# Patient Record
Sex: FEMALE | Race: WHITE | NOT HISPANIC OR LATINO | Employment: PART TIME | URBAN - METROPOLITAN AREA
[De-identification: names, ages, dates, MRNs, and addresses within clinical notes are randomized per-mention and may not be internally consistent; named-entity substitution may affect disease eponyms.]

---

## 2017-11-25 ENCOUNTER — HOSPITAL ENCOUNTER (EMERGENCY)
Facility: HOSPITAL | Age: 32
Discharge: HOME | End: 2017-11-25
Payer: MEDICAID

## 2017-11-25 VITALS
HEART RATE: 93 BPM | RESPIRATION RATE: 18 BRPM | WEIGHT: 210 LBS | SYSTOLIC BLOOD PRESSURE: 103 MMHG | TEMPERATURE: 98 F | DIASTOLIC BLOOD PRESSURE: 55 MMHG | OXYGEN SATURATION: 99 % | BODY MASS INDEX: 31.83 KG/M2 | HEIGHT: 68 IN

## 2017-11-25 DIAGNOSIS — S93.401A SPRAIN OF RIGHT ANKLE, UNSPECIFIED LIGAMENT, INITIAL ENCOUNTER: Primary | ICD-10-CM

## 2017-11-25 DIAGNOSIS — M25.571 ANKLE PAIN, RIGHT: ICD-10-CM

## 2017-11-25 LAB
B-HCG UR QL: NEGATIVE
CTP QC/QA: YES

## 2017-11-25 PROCEDURE — 99284 EMERGENCY DEPT VISIT MOD MDM: CPT | Mod: 25

## 2017-11-25 PROCEDURE — 63600175 PHARM REV CODE 636 W HCPCS: Performed by: PHYSICIAN ASSISTANT

## 2017-11-25 PROCEDURE — 81025 URINE PREGNANCY TEST: CPT | Performed by: PHYSICIAN ASSISTANT

## 2017-11-25 PROCEDURE — 96372 THER/PROPH/DIAG INJ SC/IM: CPT | Mod: 59

## 2017-11-25 RX ORDER — KETOROLAC TROMETHAMINE 30 MG/ML
30 INJECTION, SOLUTION INTRAMUSCULAR; INTRAVENOUS
Status: COMPLETED | OUTPATIENT
Start: 2017-11-25 | End: 2017-11-25

## 2017-11-25 RX ORDER — IBUPROFEN 800 MG/1
800 TABLET ORAL EVERY 6 HOURS PRN
Qty: 20 TABLET | Refills: 0 | Status: SHIPPED | OUTPATIENT
Start: 2017-11-25 | End: 2018-11-28

## 2017-11-25 RX ADMIN — KETOROLAC TROMETHAMINE 30 MG: 30 INJECTION, SOLUTION INTRAMUSCULAR at 05:11

## 2017-11-25 NOTE — ED TRIAGE NOTES
Pt states that she was getting down from her grandmothers bed yesterday around 11 am and twisted her right ankle. Pt states pain and swelling got worse at work and now she can barely put pressure on it. Pt denies hearing or feeling a crack or pop. Pt in no acute distress will continue to monitor.

## 2017-11-25 NOTE — ED PROVIDER NOTES
Encounter Date: 2017    SCRIBE #1 NOTE: I, Maisha Nvearez, am scribing for, and in the presence of,  MILANA Felton . I have scribed the following portions of the note - Other sections scribed: HPI and ROS .       History     Chief Complaint   Patient presents with    Ankle Pain     Pt reports R ankle pain since yesterday morning.  States she got out of bed and felt pain and then went to work and it got worse.      CC: Ankle Pain.   History obtained from patient.  Pt arrived via personal transportation.     HPI: This 32 y.o. Female, who has Hepatitis C, presents to the ED for evaluation of R ankle pain secondary to inverting her ankle when stepping down from bed yesterday morning. She went to work (as a ) yesterday and when she got off of work, pain in R ankle had significantly worsened. She states pain is now severe and she is unable to bear weight on the RLE. She has attempted no treatment prior to arrival in the ED. No alleviating factors. She denies numbness. No similar previous injury. She reports no further symptoms.       The history is provided by the patient. No  was used.     Review of patient's allergies indicates:  No Known Allergies  Past Medical History:   Diagnosis Date     history     Hepatitis C     Hepatitis C      History reviewed. No pertinent surgical history.  Family History   Problem Relation Age of Onset    Cancer Mother      cervical    Hypertension       grandfather    Diabetes       grandmother/grandfather    Diabetes Maternal Grandmother     Diabetes Maternal Grandfather     Hypertension Maternal Grandfather      Social History   Substance Use Topics    Smoking status: Current Every Day Smoker     Types: Cigarettes    Smokeless tobacco: Never Used      Comment: 1pk/3days    Alcohol use No     Review of Systems   Constitutional: Negative for chills and fever.   HENT: Negative for congestion.    Eyes: Negative for redness.    Respiratory: Negative for cough and shortness of breath.    Cardiovascular: Negative for chest pain.   Gastrointestinal: Negative for diarrhea, nausea and vomiting.   Genitourinary: Negative for dysuria.   Musculoskeletal: Positive for arthralgias (R ankle ).   Skin: Negative for rash and wound.   Neurological: Negative for numbness.       Physical Exam     Initial Vitals [11/25/17 0312]   BP Pulse Resp Temp SpO2   114/64 109 18 98.6 °F (37 °C) 99 %      MAP       80.67         Physical Exam    Constitutional: She appears well-developed and well-nourished.   Cardiovascular: Normal rate, regular rhythm, normal heart sounds and intact distal pulses.   Musculoskeletal: Normal range of motion. She exhibits edema and tenderness (lateral aspect of right ankle).   Neurological: She is alert and oriented to person, place, and time. She has normal strength.   Skin: Skin is warm.   Psychiatric: She has a normal mood and affect.         ED Course   Procedures  Labs Reviewed   POCT URINE PREGNANCY             Medical Decision Making:   Initial Assessment:   The patient appears to have an ankle sprain.  The patient's xrays show no signs of fracture, dislocation, or subluxation.  The patient could have a ligamentous injury, but the ankle doesn't appear to be unstable.  The patient will be discharged home to follow up with their physician or the doctor provided.  They will be treated with supportive care.             Scribe Attestation:   Scribe #1: I performed the above scribed service and the documentation accurately describes the services I performed. I attest to the accuracy of the note.    Attending Attestation:           Physician Attestation for Scribe:  Physician Attestation Statement for Scribe #1: I, MILANA Felton, reviewed documentation, as scribed by Maisha Nevarez in my presence, and it is both accurate and complete.                 ED Course      Clinical Impression:   The primary encounter diagnosis was Sprain  of right ankle, unspecified ligament, initial encounter. A diagnosis of Ankle pain, right was also pertinent to this visit.                           MILANA Hernandez  12/09/17 1926

## 2018-11-28 PROBLEM — F11.20 NARCOTIC DEPENDENCE: Status: ACTIVE | Noted: 2018-11-28

## 2018-12-04 PROBLEM — Z67.91 RH NEGATIVE STATE IN ANTEPARTUM PERIOD: Status: ACTIVE | Noted: 2018-12-04

## 2018-12-04 PROBLEM — O26.899 RH NEGATIVE STATE IN ANTEPARTUM PERIOD: Status: ACTIVE | Noted: 2018-12-04

## 2018-12-09 ENCOUNTER — HOSPITAL ENCOUNTER (EMERGENCY)
Facility: HOSPITAL | Age: 33
Discharge: HOME | End: 2018-12-09
Attending: EMERGENCY MEDICINE
Payer: MEDICAID

## 2018-12-09 VITALS
HEIGHT: 68 IN | WEIGHT: 180 LBS | TEMPERATURE: 98 F | OXYGEN SATURATION: 97 % | RESPIRATION RATE: 18 BRPM | SYSTOLIC BLOOD PRESSURE: 120 MMHG | DIASTOLIC BLOOD PRESSURE: 62 MMHG | HEART RATE: 97 BPM | BODY MASS INDEX: 27.28 KG/M2

## 2018-12-09 DIAGNOSIS — F11.20 METHADONE DEPENDENCE: ICD-10-CM

## 2018-12-09 DIAGNOSIS — O21.9 NAUSEA/VOMITING IN PREGNANCY: ICD-10-CM

## 2018-12-09 DIAGNOSIS — R10.13 EPIGASTRIC ABDOMINAL PAIN: Primary | ICD-10-CM

## 2018-12-09 LAB
ALBUMIN SERPL BCP-MCNC: 3.9 G/DL
ALP SERPL-CCNC: 64 U/L
ALT SERPL W/O P-5'-P-CCNC: 31 U/L
ANION GAP SERPL CALC-SCNC: 10 MMOL/L
AST SERPL-CCNC: 32 U/L
B-HCG UR QL: POSITIVE
BACTERIA #/AREA URNS HPF: NORMAL /HPF
BASOPHILS # BLD AUTO: 0.01 K/UL
BASOPHILS NFR BLD: 0.1 %
BILIRUB SERPL-MCNC: 0.7 MG/DL
BILIRUB UR QL STRIP: NEGATIVE
BUN SERPL-MCNC: 5 MG/DL
CALCIUM SERPL-MCNC: 9.7 MG/DL
CHLORIDE SERPL-SCNC: 104 MMOL/L
CLARITY UR: CLEAR
CO2 SERPL-SCNC: 23 MMOL/L
COLOR UR: YELLOW
CREAT SERPL-MCNC: 0.7 MG/DL
CTP QC/QA: YES
DIFFERENTIAL METHOD: ABNORMAL
EOSINOPHIL # BLD AUTO: 0 K/UL
EOSINOPHIL NFR BLD: 0 %
ERYTHROCYTE [DISTWIDTH] IN BLOOD BY AUTOMATED COUNT: 12.9 %
EST. GFR  (AFRICAN AMERICAN): >60 ML/MIN/1.73 M^2
EST. GFR  (NON AFRICAN AMERICAN): >60 ML/MIN/1.73 M^2
GLUCOSE SERPL-MCNC: 109 MG/DL
GLUCOSE UR QL STRIP: NEGATIVE
HCT VFR BLD AUTO: 39.1 %
HGB BLD-MCNC: 13.4 G/DL
HGB UR QL STRIP: NEGATIVE
KETONES UR QL STRIP: ABNORMAL
LEUKOCYTE ESTERASE UR QL STRIP: ABNORMAL
LIPASE SERPL-CCNC: 8 U/L
LYMPHOCYTES # BLD AUTO: 1 K/UL
LYMPHOCYTES NFR BLD: 9.6 %
MCH RBC QN AUTO: 29.7 PG
MCHC RBC AUTO-ENTMCNC: 34.3 G/DL
MCV RBC AUTO: 87 FL
MICROSCOPIC COMMENT: NORMAL
MONOCYTES # BLD AUTO: 0.4 K/UL
MONOCYTES NFR BLD: 3.6 %
NEUTROPHILS # BLD AUTO: 9 K/UL
NEUTROPHILS NFR BLD: 86.7 %
NITRITE UR QL STRIP: NEGATIVE
PH UR STRIP: 5 [PH] (ref 5–8)
PLATELET # BLD AUTO: 238 K/UL
PMV BLD AUTO: 10.4 FL
POTASSIUM SERPL-SCNC: 4.2 MMOL/L
PROT SERPL-MCNC: 7.6 G/DL
PROT UR QL STRIP: NEGATIVE
RBC # BLD AUTO: 4.51 M/UL
SODIUM SERPL-SCNC: 137 MMOL/L
SP GR UR STRIP: 1.02 (ref 1–1.03)
SQUAMOUS #/AREA URNS HPF: 4 /HPF
URN SPEC COLLECT METH UR: ABNORMAL
UROBILINOGEN UR STRIP-ACNC: ABNORMAL EU/DL
WBC # BLD AUTO: 10.34 K/UL
WBC #/AREA URNS HPF: 3 /HPF (ref 0–5)

## 2018-12-09 PROCEDURE — 63600175 PHARM REV CODE 636 W HCPCS: Performed by: PHYSICIAN ASSISTANT

## 2018-12-09 PROCEDURE — 96374 THER/PROPH/DIAG INJ IV PUSH: CPT

## 2018-12-09 PROCEDURE — 80053 COMPREHEN METABOLIC PANEL: CPT

## 2018-12-09 PROCEDURE — 96376 TX/PRO/DX INJ SAME DRUG ADON: CPT

## 2018-12-09 PROCEDURE — 25000003 PHARM REV CODE 250: Performed by: PHYSICIAN ASSISTANT

## 2018-12-09 PROCEDURE — 81025 URINE PREGNANCY TEST: CPT | Performed by: PHYSICIAN ASSISTANT

## 2018-12-09 PROCEDURE — 81000 URINALYSIS NONAUTO W/SCOPE: CPT

## 2018-12-09 PROCEDURE — 85025 COMPLETE CBC W/AUTO DIFF WBC: CPT

## 2018-12-09 PROCEDURE — 83690 ASSAY OF LIPASE: CPT

## 2018-12-09 PROCEDURE — 99284 EMERGENCY DEPT VISIT MOD MDM: CPT | Mod: 25

## 2018-12-09 RX ORDER — ONDANSETRON 2 MG/ML
4 INJECTION INTRAMUSCULAR; INTRAVENOUS
Status: COMPLETED | OUTPATIENT
Start: 2018-12-09 | End: 2018-12-09

## 2018-12-09 RX ADMIN — LIDOCAINE HYDROCHLORIDE: 20 SOLUTION ORAL; TOPICAL at 09:12

## 2018-12-09 RX ADMIN — ONDANSETRON 4 MG: 2 INJECTION INTRAMUSCULAR; INTRAVENOUS at 08:12

## 2018-12-09 RX ADMIN — ONDANSETRON 4 MG: 2 INJECTION INTRAMUSCULAR; INTRAVENOUS at 11:12

## 2018-12-10 NOTE — ED TRIAGE NOTES
Patient stated that she's having upper gastric pain since yesterday with N&V. Patient was seen at [facility] for same symptoms on yesterday. Pain 10/10. Squeezing and tighness in nature. Patient took her home meds today but vomited after 2 hours of taking. Patient stated that she is 8 weeks pregnant and + for Hep C.

## 2018-12-10 NOTE — ED PROVIDER NOTES
Encounter Date: 2018    SCRIBE #1 NOTE: I, Lana Teague, am scribing for, and in the presence of,  Serjio Escamilla PA-C. I have scribed the following portions of the note - Other sections scribed: HPI and ROS.       History     Chief Complaint   Patient presents with    Abdominal Pain     Pt reports she is 8 wks pregnant. Pt seen at [facility] yesterday for abdominal pain and spotting. Pt d/c'd but still having abdominal pain. Pt report pain continued today and unable to keep her methadone down.      CC: Abdominal Pain    HPI: This 8 weeks gravid 33 y.o female who has Hepatitis C presents to the ED for an evaluation of acute, constant, severe abdominal pain, greater pain to the right upper quadrant, with associated nausea, emesis, and intermittent sweating that began yesterday.  Patient reports she was seen at [facility] for the same complaint, in addition to vaginal spotting, which she reports has since resolved.  She reports being prescribed sublingual Zofran for nausea and emesis.  She reports since last night, she has been having difficulty keeping down any oral intake, including her methadone tablets.  She reports last being able to keep down the methadone on yesterday.  She reports being on Methadone for the past 4 years.  She reports recently being placed on Amoxicillin by Dr. Mclaughlin to treat a UTI.  Patient denies fever, chills, cough, rhinorrhea, chest pain, back pain, shortness of breath, or any other associated symptoms.  No alleviating factors.      The history is provided by the patient. No  was used.     Review of patient's allergies indicates:  No Known Allergies  Past Medical History:   Diagnosis Date     history     Hepatitis C     Hepatitis C      History reviewed. No pertinent surgical history.  Family History   Problem Relation Age of Onset    Cancer Mother         cervical    Hypertension Unknown         grandfather    Diabetes Unknown          grandmother/grandfather    Diabetes Maternal Grandmother     Diabetes Maternal Grandfather     Hypertension Maternal Grandfather      Social History     Tobacco Use    Smoking status: Former Smoker     Types: Cigarettes    Smokeless tobacco: Never Used    Tobacco comment: 1pk/3days   Substance Use Topics    Alcohol use: No    Drug use: No     Review of Systems   Constitutional: Positive for diaphoresis. Negative for chills and fever.   HENT: Negative for ear pain and sore throat.    Eyes: Negative for pain.   Respiratory: Negative for cough and shortness of breath.    Cardiovascular: Negative for chest pain.   Gastrointestinal: Positive for abdominal pain, nausea and vomiting. Negative for diarrhea.   Genitourinary: Negative for dysuria.   Musculoskeletal: Negative for back pain.        (-) arm or leg problems   Skin: Negative for rash.   Neurological: Negative for headaches.       Physical Exam     Initial Vitals [12/09/18 1912]   BP Pulse Resp Temp SpO2   132/67 (!) 114 20 97.9 °F (36.6 °C) 100 %      MAP       --         Physical Exam    Vitals reviewed.  Constitutional: She appears well-developed and well-nourished. She is diaphoretic. She appears distressed.   HENT:   Head: Normocephalic and atraumatic.   Right Ear: External ear normal.   Left Ear: External ear normal.   Nose: Nose normal.   Eyes: Conjunctivae are normal. No scleral icterus.   Neck: Normal range of motion. Neck supple.   Cardiovascular: Regular rhythm, normal heart sounds and intact distal pulses. Tachycardia present.    Pulmonary/Chest: Breath sounds normal. No respiratory distress. She has no wheezes. She has no rhonchi. She has no rales. She exhibits no tenderness.   Abdominal: Soft. Bowel sounds are normal. She exhibits no distension and no mass. There is tenderness in the right upper quadrant and epigastric area. There is no rigidity, no rebound, no guarding, no CVA tenderness, no tenderness at McBurney's point and negative  Rowan's sign.   Musculoskeletal: Normal range of motion. She exhibits no edema.   Neurological: She is alert and oriented to person, place, and time.   Skin: Skin is warm.         ED Course   Procedures  Labs Reviewed   URINALYSIS, REFLEX TO URINE CULTURE - Abnormal; Notable for the following components:       Result Value    Ketones, UA 2+ (*)     Urobilinogen, UA 2.0-3.0 (*)     Leukocytes, UA 1+ (*)     All other components within normal limits    Narrative:     Preferred Collection Type->Urine, Clean Catch   CBC W/ AUTO DIFFERENTIAL - Abnormal; Notable for the following components:    Gran # (ANC) 9.0 (*)     Gran% 86.7 (*)     Lymph% 9.6 (*)     Mono% 3.6 (*)     All other components within normal limits   COMPREHENSIVE METABOLIC PANEL - Abnormal; Notable for the following components:    BUN, Bld 5 (*)     All other components within normal limits   POCT URINE PREGNANCY - Abnormal; Notable for the following components:    POC Preg Test, Ur Positive (*)     All other components within normal limits   LIPASE   URINALYSIS MICROSCOPIC    Narrative:     Preferred Collection Type->Urine, Clean Catch          Imaging Results          US Abdomen Limited (Final result)  Result time 12/09/18 22:57:49    Final result by Jose Hoover MD (12/09/18 22:57:49)                 Impression:      No significant sonographic abnormality.      Electronically signed by: Jose Hoover MD  Date:    12/09/2018  Time:    22:57             Narrative:    EXAMINATION:  US ABDOMEN LIMITED    CLINICAL HISTORY:  Right upper quadrant pain, eval for cholecystitis;    TECHNIQUE:  Limited ultrasound of the right upper quadrant of the abdomen (including pancreas, liver, gallbladder, common bile duct, and right kidney) was performed.    COMPARISON:  None.    FINDINGS:  Liver: Normal in size, measuring 13 cm. Homogeneous echotexture. No focal hepatic lesions.    Gallbladder: No calculi, wall thickening, or pericholecystic fluid.  No  sonographic Rowan's sign.    Biliary system: The common duct is not dilated, measuring 5 mm.  No intrahepatic ductal dilatation.    Right kidney: Normal in size with no hydronephrosis, measuring 11 cm.    Miscellaneous: No upper abdominal ascites.                            TVUS performed at [facility] 12/8/18:  Electronically Signed By: Tommy Liu MD 12/8/2018 1:46 PM CST  Result Narrative  CLINICAL HISTORY: Cramping, bleeding    FINDINGS: The uterus is retroverted and measures 8.0 x 5.6 x 6.8 cm     There is intrauterine gestation identified with yolk sac. Crown-rump length 1.19 cm equaling a gestational age of 7 weeks 3 days.    LEANNA July 20, 2019    Current fetal cardiac rate 143 bpm    Left tube and ovary not visualized    Cul-de-sac appears normal    Right ovary measures 3.4 x 1.9 x 2.5 cm with a corpus luteal cyst measuring 1.9 cm    X-Rays:   Independently Interpreted Readings:   Other Readings:  Right upper quadrant ultrasound with no evidence of cholecystitis, CBD dilatation, or cholelithiasis.  Liver is normal size    Medical Decision Making:   ED Management:  32 y/o pregnant female, 7 weeks 5 days gestation by US, with N/V, abd pain. Pt has methadone dependence and recently dx UTI, and has been unable to keep her meds down x1 day. She is in obvious discomfort, tachycardic, normotensive and diaphoretic initially. She had IV fluids and zofran and was able to start taking her methadone, which resolved most of her symptoms, but she continued to have epigastric and right upper quadrant pain. Labs are without significant abnormality.  Right upper quadrant ultrasound showed no hepatomegaly, cholecystitis, cholelithiasis, or CBD dilatation.  Patient has Zofran from previous ED encounter, as well as amoxicillin for UTI provided by OBGYN.  Patient is tolerating p.o., pain is under control, and is comfortable.  Patient seen at outside hospital ED 2 nights ago for vomiting, abdominal pain, vaginal spotting,  received RhoGAM at that time.  No vaginal bleeding tonight.  Patient is safe for discharge.  She was given return precautions, and instructions to follow up closely with OBGYN.  Case discussed with Dr. Vivian Alarcon Attestation:   Scribe #1: I performed the above scribed service and the documentation accurately describes the services I performed. I attest to the accuracy of the note.    Attending Attestation:           Physician Attestation for Scribe:  Physician Attestation Statement for Scribe #1: I, Serjio Escamilla PA-C, reviewed documentation, as scribed by Lana Teague in my presence, and it is both accurate and complete.                    Clinical Impression:   The primary encounter diagnosis was Epigastric abdominal pain. Diagnoses of Nausea/vomiting in pregnancy and Methadone dependence were also pertinent to this visit.                             Serjio Escamilla PA-C  12/10/18 0045       Serjio Escamilla PA-C  12/10/18 0047

## 2018-12-10 NOTE — DISCHARGE INSTRUCTIONS
Be sure to avoid:  Aspirin. Avoid taking aspirin and other anti-inflammatory medicines, such as ibuprofen. They can irritate your stomach lining. Also, check with your healthcare provider before taking or stopping any medicines.  Spicy foods and caffeine. Stay away from foods prepared with spices, especially black pepper. Caffeine can also make your symptoms worse. So, avoid coffee, tea, cola drinks, and chocolate. Be sure to tell your healthcare provider about any other foods or liquids that bother your stomach.  Tobacco and alcohol. Don?t use tobacco or drink alcohol. Tobacco and alcohol can increase stomach acids and worsen your gastritis symptoms.

## 2019-01-24 PROBLEM — O99.332 PREGNANCY COMPLICATED BY TOBACCO USE IN SECOND TRIMESTER: Status: ACTIVE | Noted: 2019-01-24

## 2019-01-24 PROBLEM — O99.331 PREGNANCY COMPLICATED BY TOBACCO USE IN FIRST TRIMESTER: Status: ACTIVE | Noted: 2019-01-24

## 2019-01-28 PROBLEM — O23.42 URINARY TRACT INFECTION IN MOTHER DURING SECOND TRIMESTER OF PREGNANCY: Status: ACTIVE | Noted: 2019-01-28

## 2019-06-11 PROBLEM — O99.323 DRUG USE AFFECTING PREGNANCY IN THIRD TRIMESTER: Status: ACTIVE | Noted: 2019-06-11

## 2020-05-20 ENCOUNTER — HISTORICAL (OUTPATIENT)
Dept: ADMINISTRATIVE | Facility: HOSPITAL | Age: 35
End: 2020-05-20

## 2020-05-23 LAB — FINAL CULTURE: NO GROWTH

## 2020-06-01 ENCOUNTER — HISTORICAL (OUTPATIENT)
Dept: ADMINISTRATIVE | Facility: HOSPITAL | Age: 35
End: 2020-06-01

## 2020-06-04 LAB — FINAL CULTURE: NO GROWTH

## 2022-04-16 PROBLEM — R10.9 LEFT FLANK PAIN: Status: ACTIVE | Noted: 2022-04-16

## 2022-04-16 PROBLEM — R30.0 DYSURIA: Status: ACTIVE | Noted: 2022-04-16

## 2022-09-17 PROBLEM — J02.9 VIRAL PHARYNGITIS: Status: ACTIVE | Noted: 2022-09-17

## 2023-11-10 PROBLEM — J10.1 INFLUENZA A: Status: ACTIVE | Noted: 2023-11-10
